# Patient Record
Sex: FEMALE | Race: WHITE | NOT HISPANIC OR LATINO
[De-identification: names, ages, dates, MRNs, and addresses within clinical notes are randomized per-mention and may not be internally consistent; named-entity substitution may affect disease eponyms.]

---

## 2023-05-24 PROBLEM — Z00.00 ENCOUNTER FOR PREVENTIVE HEALTH EXAMINATION: Status: ACTIVE | Noted: 2023-05-24

## 2023-05-25 ENCOUNTER — APPOINTMENT (OUTPATIENT)
Dept: ORTHOPEDIC SURGERY | Facility: CLINIC | Age: 63
End: 2023-05-25
Payer: MEDICARE

## 2023-05-25 VITALS — WEIGHT: 184 LBS | HEIGHT: 66 IN | BODY MASS INDEX: 29.57 KG/M2

## 2023-05-25 DIAGNOSIS — Z86.69 PERSONAL HISTORY OF OTHER DISEASES OF THE NERVOUS SYSTEM AND SENSE ORGANS: ICD-10-CM

## 2023-05-25 DIAGNOSIS — Z87.19 PERSONAL HISTORY OF OTHER DISEASES OF THE DIGESTIVE SYSTEM: ICD-10-CM

## 2023-05-25 PROCEDURE — 99204 OFFICE O/P NEW MOD 45 MIN: CPT

## 2023-05-25 PROCEDURE — 72100 X-RAY EXAM L-S SPINE 2/3 VWS: CPT | Mod: FY

## 2023-05-25 RX ORDER — OMEPRAZOLE 20 MG/1
20 CAPSULE, DELAYED RELEASE ORAL
Refills: 0 | Status: ACTIVE | COMMUNITY

## 2023-05-25 RX ORDER — PRAMIPEXOLE DIHYDROCHLORIDE 0.5 MG/1
0.5 TABLET ORAL
Refills: 0 | Status: ACTIVE | COMMUNITY

## 2023-05-25 NOTE — ASSESSMENT
[FreeTextEntry1] : T12 compression fracture \par \par Patient will begin physical therapy. \par \par Recommend: - NSAID - Heating pad - Muscle relaxer - Core strengthening exercise - Hamstring stretching exercise Patient is given back rehabilitation exercise book. \par \par Follow up in 2 months

## 2023-05-25 NOTE — HISTORY OF PRESENT ILLNESS
[Mid-back] : mid-back [Sudden] : sudden [8] : 8 [6] : 6 [Sharp] : sharp [Constant] : constant [Ice] : ice [Walking] : walking [Retired] : Work status: retired [de-identified] : Patient presents today with mid back pain after falling on a boat in [place] in February. Went to an urgent care, had xrays and sent to PT. Patient has T12 fracture. Went to [facility], abdominal CT. States she has localized pain. Admits to taking Hydrocodone and Methocarbamol.  [] : no [FreeTextEntry5] : lower back pain after falling on a boat in February [de-identified] : movement

## 2023-05-25 NOTE — DATA REVIEWED
[MRI] : MRI [Lumbar Spine] : lumbar spine [Report was reviewed and noted in the chart] : The report was reviewed and noted in the chart [I independently reviewed and interpreted images and report] : I independently reviewed and interpreted images and report [FreeTextEntry1] : T12 compression fracture

## 2023-05-25 NOTE — IMAGING
[Disc space narrowing] : Disc space narrowing [FreeTextEntry1] : Kyphoplasty T12, with superior migration of cement into disc space

## 2023-06-21 ENCOUNTER — FORM ENCOUNTER (OUTPATIENT)
Age: 63
End: 2023-06-21

## 2023-07-24 ENCOUNTER — APPOINTMENT (OUTPATIENT)
Dept: ORTHOPEDIC SURGERY | Facility: CLINIC | Age: 63
End: 2023-07-24
Payer: MEDICARE

## 2023-07-24 VITALS — BODY MASS INDEX: 29.57 KG/M2 | HEIGHT: 66 IN | WEIGHT: 184 LBS

## 2023-07-24 DIAGNOSIS — M51.37 OTHER INTERVERTEBRAL DISC DEGENERATION, LUMBOSACRAL REGION: ICD-10-CM

## 2023-07-24 DIAGNOSIS — M47.817 SPONDYLOSIS W/OUT MYELOPATHY OR RADICULOPATHY, LUMBOSACRAL REGION: ICD-10-CM

## 2023-07-24 DIAGNOSIS — M51.36 OTHER INTERVERTEBRAL DISC DEGENERATION, LUMBAR REGION: ICD-10-CM

## 2023-07-24 PROCEDURE — 72070 X-RAY EXAM THORAC SPINE 2VWS: CPT

## 2023-07-24 PROCEDURE — 99214 OFFICE O/P EST MOD 30 MIN: CPT

## 2023-07-24 NOTE — ASSESSMENT
[FreeTextEntry1] : T12 compression fracture \par \par Hold PT \par \par Patient will begin Medrol.  Patient advised not to take any NSAIDs while taking the steroids. \par \par Referral to pain management for injections, follow up 2 weeks after injection - Bilateral L4-S1 facet joint CSI\par \par Recommend: - NSAID - Heating pad - Muscle relaxer - Core strengthening exercise - Hamstring stretching exercise Patient is given back rehabilitation exercise book. \par \par

## 2023-07-24 NOTE — HISTORY OF PRESENT ILLNESS
[Mid-back] : mid-back [Sudden] : sudden [8] : 8 [6] : 6 [Sharp] : sharp [Constant] : constant [Ice] : ice [Walking] : walking [Retired] : Work status: retired [de-identified] : Follow up thoracic spine T12 fracture. States she has constant pain in her mid/lower back. States she has pain with sneezing and coughing. Admits to going to PT 3x a week with no relief. Admits to doing hot and cold compresses. Admits to taking Hydrocodone and Methocarbamol for pain. Admits to taking Extra Strength Tylenol PRN for pain.  [] : no [FreeTextEntry5] : lower back pain after falling on a boat in February [de-identified] : movement

## 2023-08-28 ENCOUNTER — APPOINTMENT (OUTPATIENT)
Dept: ORTHOPEDIC SURGERY | Facility: CLINIC | Age: 63
End: 2023-08-28

## 2023-09-14 ENCOUNTER — APPOINTMENT (OUTPATIENT)
Dept: PAIN MANAGEMENT | Facility: CLINIC | Age: 63
End: 2023-09-14

## 2023-10-17 ENCOUNTER — APPOINTMENT (OUTPATIENT)
Dept: ORTHOPEDIC SURGERY | Facility: CLINIC | Age: 63
End: 2023-10-17
Payer: MEDICARE

## 2023-10-17 VITALS — BODY MASS INDEX: 29.57 KG/M2 | WEIGHT: 184 LBS | HEIGHT: 66 IN

## 2023-10-17 DIAGNOSIS — Z78.9 OTHER SPECIFIED HEALTH STATUS: ICD-10-CM

## 2023-10-17 DIAGNOSIS — M48.061 SPINAL STENOSIS, LUMBAR REGION WITHOUT NEUROGENIC CLAUDICATION: ICD-10-CM

## 2023-10-17 DIAGNOSIS — S22.080A WEDGE COMPRESSION FRACTURE OF T11-T12 VERTEBRA, INITIAL ENCOUNTER FOR CLOSED FRACTURE: ICD-10-CM

## 2023-10-17 PROCEDURE — 99214 OFFICE O/P EST MOD 30 MIN: CPT

## 2023-10-17 PROCEDURE — 72100 X-RAY EXAM L-S SPINE 2/3 VWS: CPT

## 2023-10-24 ENCOUNTER — APPOINTMENT (OUTPATIENT)
Dept: ORTHOPEDIC SURGERY | Facility: CLINIC | Age: 63
End: 2023-10-24

## 2023-12-14 ENCOUNTER — APPOINTMENT (OUTPATIENT)
Dept: NEUROLOGY | Facility: CLINIC | Age: 63
End: 2023-12-14

## 2023-12-19 ENCOUNTER — APPOINTMENT (OUTPATIENT)
Dept: ORTHOPEDIC SURGERY | Facility: CLINIC | Age: 63
End: 2023-12-19

## 2024-05-02 ENCOUNTER — APPOINTMENT (OUTPATIENT)
Dept: COLORECTAL SURGERY | Facility: CLINIC | Age: 64
End: 2024-05-02
Payer: MEDICARE

## 2024-05-02 VITALS
RESPIRATION RATE: 16 BRPM | WEIGHT: 173 LBS | HEIGHT: 66 IN | BODY MASS INDEX: 27.8 KG/M2 | OXYGEN SATURATION: 97 % | SYSTOLIC BLOOD PRESSURE: 121 MMHG | DIASTOLIC BLOOD PRESSURE: 79 MMHG | HEART RATE: 68 BPM

## 2024-05-02 DIAGNOSIS — K64.8 RESIDUAL HEMORRHOIDAL SKIN TAGS: ICD-10-CM

## 2024-05-02 DIAGNOSIS — K64.4 RESIDUAL HEMORRHOIDAL SKIN TAGS: ICD-10-CM

## 2024-05-02 PROCEDURE — 46221 LIGATION OF HEMORRHOID(S): CPT

## 2024-05-02 PROCEDURE — 99204 OFFICE O/P NEW MOD 45 MIN: CPT | Mod: 25

## 2024-05-02 NOTE — ASSESSMENT
[FreeTextEntry1] : Ms. Sam presents to the office for consultation secondary to bleeding internal hemorrhoids.  In office today, rubber band ligation was performed to her posterior column x 2.  She was advised on what to expect postprocedure and also counseled on the temporizing nature of rubber bands in treating hemorrhoidal disease.  She should follow-up in 3 to 4 weeks for repeat ligations.  All questions and concerns addressed.

## 2024-05-02 NOTE — CONSULT LETTER
[Dear  ___] : Dear  [unfilled], [Consult Letter:] : I had the pleasure of evaluating your patient, [unfilled]. [Please see my note below.] : Please see my note below. [Consult Closing:] : Thank you very much for allowing me to participate in the care of this patient.  If you have any questions, please do not hesitate to contact me. [Sincerely,] : Sincerely, [FreeTextEntry3] : Nahed Melendez MD

## 2024-05-02 NOTE — HISTORY OF PRESENT ILLNESS
[FreeTextEntry1] : Ms Sam presents to the office for consultation secondary to bleeding internal hemorrhoids.  She had a colonoscopy 1 week earlier which was otherwise unremarkable.  Advised to come to our offices for rubber band ligations.  She notes bowel movements are passed without issues, however, she will notice bleeding regardless of stool consistency.  Here for further evaluation.

## 2024-05-02 NOTE — PHYSICAL EXAM
[Normal rectal exam] : exam was normal [Reduce Spontaneously] : a spontaneously reducible (grade II) [Skin Tags] : there were no residual hemorrhoidal skin tags seen [Normal] : was normal [None] : there was no rectal mass  [Gross Blood] : no gross blood [No Rash or Lesion] : No rash or lesion [Alert] : alert [Oriented to Person] : oriented to person [Oriented to Place] : oriented to place [Oriented to Time] : oriented to time [Calm] : calm [de-identified] : No apparent distress [de-identified] : Normocephalic atraumatic [de-identified] : Moving all extremities x 4

## 2024-05-31 ENCOUNTER — APPOINTMENT (OUTPATIENT)
Dept: INFECTIOUS DISEASE | Facility: CLINIC | Age: 64
End: 2024-05-31
Payer: MEDICARE

## 2024-05-31 VITALS
OXYGEN SATURATION: 98 % | HEART RATE: 73 BPM | BODY MASS INDEX: 27.97 KG/M2 | SYSTOLIC BLOOD PRESSURE: 130 MMHG | HEIGHT: 66 IN | DIASTOLIC BLOOD PRESSURE: 80 MMHG | WEIGHT: 174 LBS | TEMPERATURE: 97.9 F

## 2024-05-31 DIAGNOSIS — A69.20 LYME DISEASE, UNSPECIFIED: ICD-10-CM

## 2024-05-31 DIAGNOSIS — M62.838 OTHER MUSCLE SPASM: ICD-10-CM

## 2024-05-31 PROCEDURE — 99204 OFFICE O/P NEW MOD 45 MIN: CPT

## 2024-05-31 RX ORDER — SUMATRIPTAN 50 MG/1
TABLET, FILM COATED ORAL
Refills: 0 | Status: ACTIVE | COMMUNITY

## 2024-05-31 RX ORDER — METHOCARBAMOL 750 MG/1
750 TABLET, FILM COATED ORAL
Refills: 0 | Status: DISCONTINUED | COMMUNITY
End: 2024-05-31

## 2024-05-31 RX ORDER — METHYLPREDNISOLONE 4 MG/1
4 TABLET ORAL
Qty: 1 | Refills: 0 | Status: DISCONTINUED | COMMUNITY
Start: 2023-07-24 | End: 2024-05-31

## 2024-05-31 RX ORDER — DIAZEPAM 5 MG/1
5 TABLET ORAL
Qty: 2 | Refills: 0 | Status: DISCONTINUED | COMMUNITY
Start: 2023-07-24 | End: 2024-05-31

## 2024-05-31 NOTE — PHYSICAL EXAM
[General Appearance - Alert] : alert [General Appearance - In No Acute Distress] : in no acute distress [General Appearance - Well Nourished] : well nourished [General Appearance - Well-Appearing] : healthy appearing [FreeTextEntry1] : normal ambulation and gait, no tremor [Sclera] : the sclera and conjunctiva were normal [PERRL With Normal Accommodation] : pupils were equal in size, round, reactive to light [Extraocular Movements] : extraocular movements were intact [Outer Ear] : the ears and nose were normal in appearance [Hearing Threshold Finger Rub Not [place]] : hearing was normal [Examination Of The Oral Cavity] : the lips and gums were normal [Oropharynx] : the oropharynx was normal with no thrush [Neck Appearance] : the appearance of the neck was normal [Neck Cervical Mass (___cm)] : no neck mass was observed [Jugular Venous Distention Increased] : there was no jugular-venous distention [Thyroid Diffuse Enlargement] : the thyroid was not enlarged [Respiration, Rhythm And Depth] : normal respiratory rhythm and effort [Exaggerated Use Of Accessory Muscles For Inspiration] : no accessory muscle use [Auscultation Breath Sounds / Voice Sounds] : lungs were clear to auscultation bilaterally [Heart Rate And Rhythm] : heart rate was normal and rhythm regular [Heart Sounds] : normal S1 and S2 [Heart Sounds Gallop] : no gallops [Murmurs] : no murmurs [Heart Sounds Pericardial Friction Rub] : no pericardial rub [Full Pulse] : the pedal pulses are present [Edema] : there was no peripheral edema [Bowel Sounds] : normal bowel sounds [Abdomen Soft] : soft [Abdomen Tenderness] : non-tender [Abdomen Mass (___ Cm)] : no abdominal mass palpated [Costovertebral Angle Tenderness] : no CVA tenderness [No Palpable Adenopathy] : no palpable adenopathy [Cervical Lymph Nodes Enlarged Posterior Bilaterally] : posterior cervical [Cervical Lymph Nodes Enlarged Anterior Bilaterally] : anterior cervical [Musculoskeletal - Swelling] : no joint swelling [Range of Motion to Joints] : range of motion to joints [Nail Clubbing] : no clubbing  or cyanosis of the fingernails [Motor Tone] : muscle strength and tone were normal [Skin Color & Pigmentation] : normal skin color and pigmentation [] : no rash [Skin Lesions] : no skin lesions [Cranial Nerves] : cranial nerves 2-12 were intact [Sensation] : the sensory exam was normal to light touch and pinprick [Motor Exam] : the motor exam was normal [No Focal Deficits] : no focal deficits [Oriented To Time, Place, And Person] : oriented to person, place, and time [Affect] : the affect was normal

## 2024-05-31 NOTE — HISTORY OF PRESENT ILLNESS
[FreeTextEntry1] : 64 Female with 3 year progressive confusion, spasms, muscle cramps  followed b neurology and medicine sent for evaluation.  No fever, tick bite, stiff neck.  Has intermittent severe spasms of upper and lower extremities. No seizure history, had Normal EEG  5/19/24.   Pt [id number] for evaluation of possible Lyme, had neg Lyme AB an Neg IgG WB with 2 positive bands. Pt has confusion, difficulty remembering, and difficulty driving. MRI Brain  5/16/24:  Mild chronic small vessel ischemic changes  LABS  5/16/24:  Creat  0.7,  AST 17, ALT  18,  Hgb A1C  5.6,  CPK  105,  TSH  2.9,  Lyme AB Negative, Ferritin  143,  B12  522,  folate  19.7,  RPR Neg, ANIYA neg,  ACH AB Neg,  Striated muscle AB Neg,  CRP < 3,  SIMONA-1 AB Neg, MUSK AB Neg,   LABS  5/24/24:  Alpha gal panel Neg,  Babesia Neg, Anaplasma Neg

## 2024-05-31 NOTE — REASON FOR VISIT
[FreeTextEntry1] : New patient evaluation for abnoral tick panel. Patient states she has been suffering with body spasms for about 2 years. Has been to neurologist at [facility] and all tests neg. Had recent abnormal tick panel come back from pcp  Dr. Issa Soto.  Currently not on abx therapy. Has been treated with Parkinson's medication for several years and not helping. Body spasming occurs from trunk up, not lower extremities.

## 2024-05-31 NOTE — ASSESSMENT
[FreeTextEntry1] : 64 Female with Neurologic Disease with spasms and cognitive impairment undergoing neurologic evaluation with additional nerve testing scheduled for June.  Question if pt has Lyme disease, had neg AB testing with only 2 positive bands on IgG WB. No fever, HA, neck pain, rash, tick bite.  PLAN:  1. follow neuro W/U              2. LABS:   Lyme AB                3.  Phone F/U Tues 6/4 [Treatment Education] : treatment education [Medical Care Issues] : medical care issues

## 2024-05-31 NOTE — REVIEW OF SYSTEMS
[Fever] : no fever [Chills] : no chills [Body Aches] : body aches [Difficulty Sleeping] : no difficulty sleeping [Feeling Tired] : feeling tired [Feeling Sick] : not feeling sick [Normal Appetite] : appetite not normal  [Eye Pain] : no eye pain [Red Eyes] : eyes not red [Eyesight Problems] : no eyesight problems [Discharge From Eyes] : no purulent discharge from the eyes [Earache] : no earache [Loss Of Hearing] : no hearing loss [Nasal Discharge] : no nasal discharge [Sore Throat] : no sore throat [Hoarseness] : no hoarseness [Chest Pain] : no chest pain [Palpitations] : no palpitations [Leg Claudication] : no intermittent leg claudication [Lower Ext Edema] : no lower extremity edema [Shortness Of Breath] : no shortness of breath [Wheezing] : no wheezing [Cough] : no cough [SOB on Exertion] : no shortness of breath during exertion [Sputum] : not coughing up ~M sputum [Pleuritic Chest Pain] : no pleuritic chest pain [Abdominal Pain] : no abdominal pain [Vomiting] : no vomiting [Constipation] : no constipation [Diarrhea] : no diarrhea [Dysuria] : no dysuria [Confused] : confusion [Convulsions] : no convulsions [Dizziness] : no dizziness [Negative] : Heme/Lymph [FreeTextEntry9] : muscle spasms

## 2024-06-01 LAB
B BURGDOR AB SER-IMP: NEGATIVE
B BURGDOR IGG+IGM SER QL: 0.08 INDEX
LDH SERPL-CCNC: 208 U/L

## 2024-08-07 ENCOUNTER — APPOINTMENT (OUTPATIENT)
Dept: COLORECTAL SURGERY | Facility: CLINIC | Age: 64
End: 2024-08-07

## 2024-08-07 PROCEDURE — 46221 LIGATION OF HEMORRHOID(S): CPT

## 2024-08-07 PROCEDURE — 99214 OFFICE O/P EST MOD 30 MIN: CPT | Mod: 25

## 2024-08-07 NOTE — HISTORY OF PRESENT ILLNESS
[FreeTextEntry1] : Ms Sam presents to the office for consultation secondary to bleeding internal hemorrhoids.  She had a colonoscopy 1 week earlier which was otherwise unremarkable.  Advised to come to our offices for rubber band ligations.  She notes bowel movements are passed without issues, however, she will notice bleeding regardless of stool consistency.  Here for further evaluation.  8/7/24 Here for followup.  Reports occasional blood per rectum especially when stools are hard.  Does not notice any rectal bleeding when stools are easily passed.  She had fairly good results with rubber band ligation from last visit.

## 2024-08-07 NOTE — ASSESSMENT
[FreeTextEntry1] : Ms. Sam presents to the office for consultation secondary to bleeding internal hemorrhoids.  In office today, rubber band ligation was performed to her posterior column x 2.  She was advised on what to expect postprocedure and also counseled on the temporizing nature of rubber bands in treating hemorrhoidal disease.  She should follow-up in 3 to 4 weeks for repeat ligations.  All questions and concerns addressed.  8/7/24 Ms Sam presents to the office with rectal bleeding.  She is aware that the bleeding is worse when she is passing harder stools.  Advised to improve her bowel regimen with Metamucil, ample water intake as well as MiraLAX nightly as needed.  In office today, we proceeded to rubber band ligate her left lateral column to good effect.  She was reminded on what to expect and can follow-up as needed.

## 2024-08-07 NOTE — PHYSICAL EXAM
[Normal rectal exam] : exam was normal [Reduce Spontaneously] : a spontaneously reducible (grade II) [Skin Tags] : there were no residual hemorrhoidal skin tags seen [Normal] : was normal [None] : there was no rectal mass  [Gross Blood] : no gross blood [No Rash or Lesion] : No rash or lesion [Alert] : alert [Oriented to Person] : oriented to person [Oriented to Place] : oriented to place [Oriented to Time] : oriented to time [Calm] : calm [de-identified] : No apparent distress [de-identified] : Normocephalic atraumatic [de-identified] : Moving all extremities x 4

## 2024-09-04 ENCOUNTER — APPOINTMENT (OUTPATIENT)
Dept: COLORECTAL SURGERY | Facility: CLINIC | Age: 64
End: 2024-09-04
Payer: MEDICARE

## 2024-09-04 VITALS
RESPIRATION RATE: 14 BRPM | SYSTOLIC BLOOD PRESSURE: 129 MMHG | DIASTOLIC BLOOD PRESSURE: 86 MMHG | HEART RATE: 79 BPM | WEIGHT: 180 LBS | BODY MASS INDEX: 28.93 KG/M2 | HEIGHT: 66 IN

## 2024-09-04 DIAGNOSIS — K64.4 RESIDUAL HEMORRHOIDAL SKIN TAGS: ICD-10-CM

## 2024-09-04 DIAGNOSIS — K64.8 RESIDUAL HEMORRHOIDAL SKIN TAGS: ICD-10-CM

## 2024-09-04 PROCEDURE — 99214 OFFICE O/P EST MOD 30 MIN: CPT | Mod: 25

## 2024-09-04 PROCEDURE — 46221 LIGATION OF HEMORRHOID(S): CPT

## 2024-09-04 NOTE — HISTORY OF PRESENT ILLNESS
[FreeTextEntry1] : Ms Sam presents to the office for consultation secondary to bleeding internal hemorrhoids.  She had a colonoscopy 1 week earlier which was otherwise unremarkable.  Advised to come to our offices for rubber band ligations.  She notes bowel movements are passed without issues, however, she will notice bleeding regardless of stool consistency.  Here for further evaluation.  8/7/24 Here for followup.  Reports occasional blood per rectum especially when stools are hard.  Does not notice any rectal bleeding when stools are easily passed.  She had fairly good results with rubber band ligation from last visit.  9/4/24 Here for followup.  Overall, notices stable results from rubber band ligation as compared to last time.

## 2024-09-04 NOTE — ASSESSMENT
[FreeTextEntry1] : Ms. Sam presents to the office for consultation secondary to bleeding internal hemorrhoids.  In office today, rubber band ligation was performed to her posterior column x 2.  She was advised on what to expect postprocedure and also counseled on the temporizing nature of rubber bands in treating hemorrhoidal disease.  She should follow-up in 3 to 4 weeks for repeat ligations.  All questions and concerns addressed.  8/7/24 Ms Sam presents to the office with rectal bleeding.  She is aware that the bleeding is worse when she is passing harder stools.  Advised to improve her bowel regimen with Metamucil, ample water intake as well as MiraLAX nightly as needed.  In office today, we proceeded to rubber band ligate her left lateral column to good effect.  She was reminded on what to expect and can follow-up as needed.  9/4/24 Ms Sam returns to the office for follow-up.  She has mild improvement from rubber band ligation as opposed to last office visit.  In office today we proceeded to rubber band ligate her anterior and left lateral column to good effect.  She was reminded on what to expect postprocedure and at this point, can follow-up as needed.

## 2024-09-04 NOTE — PHYSICAL EXAM
[Normal rectal exam] : exam was normal [Reduce Spontaneously] : a spontaneously reducible (grade II) [Normal] : was normal [None] : there was no rectal mass  [No Rash or Lesion] : No rash or lesion [Alert] : alert [Oriented to Person] : oriented to person [Oriented to Place] : oriented to place [Oriented to Time] : oriented to time [Calm] : calm [Skin Tags] : there were no residual hemorrhoidal skin tags seen [Gross Blood] : no gross blood [de-identified] : No apparent distress [de-identified] : Normocephalic atraumatic [de-identified] : Moving all extremities x 4